# Patient Record
Sex: FEMALE | Race: ASIAN | NOT HISPANIC OR LATINO
[De-identification: names, ages, dates, MRNs, and addresses within clinical notes are randomized per-mention and may not be internally consistent; named-entity substitution may affect disease eponyms.]

---

## 2018-01-01 ENCOUNTER — TRANSCRIPTION ENCOUNTER (OUTPATIENT)
Age: 28
End: 2018-01-01

## 2018-01-01 ENCOUNTER — INPATIENT (INPATIENT)
Facility: HOSPITAL | Age: 28
LOS: 1 days | Discharge: HOME | End: 2018-01-03
Attending: OBSTETRICS & GYNECOLOGY | Admitting: OBSTETRICS & GYNECOLOGY

## 2018-01-01 VITALS
HEART RATE: 81 BPM | TEMPERATURE: 99 F | SYSTOLIC BLOOD PRESSURE: 125 MMHG | DIASTOLIC BLOOD PRESSURE: 83 MMHG | OXYGEN SATURATION: 98 %

## 2018-01-01 DIAGNOSIS — O26.899 OTHER SPECIFIED PREGNANCY RELATED CONDITIONS, UNSPECIFIED TRIMESTER: ICD-10-CM

## 2018-01-01 LAB
BASOPHILS # BLD AUTO: 0.04 K/UL — SIGNIFICANT CHANGE UP (ref 0–0.2)
BASOPHILS NFR BLD AUTO: 0.3 % — SIGNIFICANT CHANGE UP (ref 0–2)
BLD GP AB SCN SERPL QL: NEGATIVE — SIGNIFICANT CHANGE UP
EOSINOPHIL # BLD AUTO: 0.04 K/UL — SIGNIFICANT CHANGE UP (ref 0–0.5)
EOSINOPHIL NFR BLD AUTO: 0.3 % — SIGNIFICANT CHANGE UP (ref 0–6)
HCT VFR BLD CALC: 35.6 % — SIGNIFICANT CHANGE UP (ref 34.5–45)
HGB BLD-MCNC: 11.9 G/DL — SIGNIFICANT CHANGE UP (ref 11.5–15.5)
IMM GRANULOCYTES # BLD AUTO: 0.15 # — SIGNIFICANT CHANGE UP
IMM GRANULOCYTES NFR BLD AUTO: 1.3 % — SIGNIFICANT CHANGE UP (ref 0–1.5)
LYMPHOCYTES # BLD AUTO: 18.6 % — SIGNIFICANT CHANGE UP (ref 13–44)
LYMPHOCYTES # BLD AUTO: 2.21 K/UL — SIGNIFICANT CHANGE UP (ref 1–3.3)
MCHC RBC-ENTMCNC: 26.2 PG — LOW (ref 27–34)
MCHC RBC-ENTMCNC: 33.4 % — SIGNIFICANT CHANGE UP (ref 32–36)
MCV RBC AUTO: 78.4 FL — LOW (ref 80–100)
MONOCYTES # BLD AUTO: 0.46 K/UL — SIGNIFICANT CHANGE UP (ref 0–0.9)
MONOCYTES NFR BLD AUTO: 3.9 % — SIGNIFICANT CHANGE UP (ref 2–14)
NEUTROPHILS # BLD AUTO: 9 K/UL — HIGH (ref 1.8–7.4)
NEUTROPHILS NFR BLD AUTO: 75.6 % — SIGNIFICANT CHANGE UP (ref 43–77)
NRBC # FLD: 0 — SIGNIFICANT CHANGE UP
PLATELET # BLD AUTO: 331 K/UL — SIGNIFICANT CHANGE UP (ref 150–400)
PMV BLD: 9.9 FL — SIGNIFICANT CHANGE UP (ref 7–13)
RBC # BLD: 4.54 M/UL — SIGNIFICANT CHANGE UP (ref 3.8–5.2)
RBC # FLD: 12.3 % — SIGNIFICANT CHANGE UP (ref 10.3–14.5)
RH IG SCN BLD-IMP: POSITIVE — SIGNIFICANT CHANGE UP
T PALLIDUM AB TITR SER: NEGATIVE — SIGNIFICANT CHANGE UP
WBC # BLD: 11.9 K/UL — HIGH (ref 3.8–10.5)
WBC # FLD AUTO: 11.9 K/UL — HIGH (ref 3.8–10.5)

## 2018-01-01 RX ORDER — TETANUS TOXOID, REDUCED DIPHTHERIA TOXOID AND ACELLULAR PERTUSSIS VACCINE, ADSORBED 5; 2.5; 8; 8; 2.5 [IU]/.5ML; [IU]/.5ML; UG/.5ML; UG/.5ML; UG/.5ML
0.5 SUSPENSION INTRAMUSCULAR ONCE
Qty: 0 | Refills: 0 | Status: COMPLETED | OUTPATIENT
Start: 2018-01-01

## 2018-01-01 RX ORDER — DOCUSATE SODIUM 100 MG
100 CAPSULE ORAL
Qty: 0 | Refills: 0 | Status: DISCONTINUED | OUTPATIENT
Start: 2018-01-01 | End: 2018-01-03

## 2018-01-01 RX ORDER — ACETAMINOPHEN 500 MG
975 TABLET ORAL EVERY 6 HOURS
Qty: 0 | Refills: 0 | Status: DISCONTINUED | OUTPATIENT
Start: 2018-01-01 | End: 2018-01-03

## 2018-01-01 RX ORDER — INFLUENZA VIRUS VACCINE 15; 15; 15; 15 UG/.5ML; UG/.5ML; UG/.5ML; UG/.5ML
0.5 SUSPENSION INTRAMUSCULAR ONCE
Qty: 0 | Refills: 0 | Status: DISCONTINUED | OUTPATIENT
Start: 2018-01-01 | End: 2018-01-03

## 2018-01-01 RX ORDER — PRAMOXINE HYDROCHLORIDE 150 MG/15G
1 AEROSOL, FOAM RECTAL EVERY 4 HOURS
Qty: 0 | Refills: 0 | Status: DISCONTINUED | OUTPATIENT
Start: 2018-01-01 | End: 2018-01-01

## 2018-01-01 RX ORDER — KETOROLAC TROMETHAMINE 30 MG/ML
30 SYRINGE (ML) INJECTION ONCE
Qty: 0 | Refills: 0 | Status: DISCONTINUED | OUTPATIENT
Start: 2018-01-01 | End: 2018-01-01

## 2018-01-01 RX ORDER — DIBUCAINE 1 %
1 OINTMENT (GRAM) RECTAL EVERY 4 HOURS
Qty: 0 | Refills: 0 | Status: DISCONTINUED | OUTPATIENT
Start: 2018-01-01 | End: 2018-01-01

## 2018-01-01 RX ORDER — AER TRAVELER 0.5 G/1
1 SOLUTION RECTAL; TOPICAL EVERY 4 HOURS
Qty: 0 | Refills: 0 | Status: DISCONTINUED | OUTPATIENT
Start: 2018-01-01 | End: 2018-01-03

## 2018-01-01 RX ORDER — SIMETHICONE 80 MG/1
80 TABLET, CHEWABLE ORAL EVERY 6 HOURS
Qty: 0 | Refills: 0 | Status: DISCONTINUED | OUTPATIENT
Start: 2018-01-01 | End: 2018-01-03

## 2018-01-01 RX ORDER — OXYTOCIN 10 UNIT/ML
41.67 VIAL (ML) INJECTION
Qty: 20 | Refills: 0 | Status: DISCONTINUED | OUTPATIENT
Start: 2018-01-01 | End: 2018-01-01

## 2018-01-01 RX ORDER — DIBUCAINE 1 %
1 OINTMENT (GRAM) RECTAL EVERY 4 HOURS
Qty: 0 | Refills: 0 | Status: DISCONTINUED | OUTPATIENT
Start: 2018-01-01 | End: 2018-01-03

## 2018-01-01 RX ORDER — ACETAMINOPHEN 500 MG
975 TABLET ORAL EVERY 6 HOURS
Qty: 0 | Refills: 0 | Status: COMPLETED | OUTPATIENT
Start: 2018-01-01 | End: 2018-11-30

## 2018-01-01 RX ORDER — DIPHENHYDRAMINE HCL 50 MG
25 CAPSULE ORAL EVERY 6 HOURS
Qty: 0 | Refills: 0 | Status: DISCONTINUED | OUTPATIENT
Start: 2018-01-01 | End: 2018-01-03

## 2018-01-01 RX ORDER — LANOLIN
1 OINTMENT (GRAM) TOPICAL EVERY 6 HOURS
Qty: 0 | Refills: 0 | Status: DISCONTINUED | OUTPATIENT
Start: 2018-01-01 | End: 2018-01-03

## 2018-01-01 RX ORDER — OXYTOCIN 10 UNIT/ML
333.33 VIAL (ML) INJECTION
Qty: 20 | Refills: 0 | Status: DISCONTINUED | OUTPATIENT
Start: 2018-01-01 | End: 2018-01-01

## 2018-01-01 RX ORDER — HYDROCORTISONE 1 %
1 OINTMENT (GRAM) TOPICAL EVERY 4 HOURS
Qty: 0 | Refills: 0 | Status: DISCONTINUED | OUTPATIENT
Start: 2018-01-01 | End: 2018-01-02

## 2018-01-01 RX ORDER — GLYCERIN ADULT
1 SUPPOSITORY, RECTAL RECTAL AT BEDTIME
Qty: 0 | Refills: 0 | Status: DISCONTINUED | OUTPATIENT
Start: 2018-01-01 | End: 2018-01-03

## 2018-01-01 RX ORDER — IBUPROFEN 200 MG
600 TABLET ORAL EVERY 6 HOURS
Qty: 0 | Refills: 0 | Status: COMPLETED | OUTPATIENT
Start: 2018-01-01 | End: 2018-11-30

## 2018-01-01 RX ORDER — SODIUM CHLORIDE 9 MG/ML
3 INJECTION INTRAMUSCULAR; INTRAVENOUS; SUBCUTANEOUS EVERY 8 HOURS
Qty: 0 | Refills: 0 | Status: DISCONTINUED | OUTPATIENT
Start: 2018-01-01 | End: 2018-01-03

## 2018-01-01 RX ORDER — OXYTOCIN 10 UNIT/ML
41.67 VIAL (ML) INJECTION
Qty: 20 | Refills: 0 | Status: DISCONTINUED | OUTPATIENT
Start: 2018-01-01 | End: 2018-01-02

## 2018-01-01 RX ORDER — PRAMOXINE HYDROCHLORIDE 150 MG/15G
1 AEROSOL, FOAM RECTAL EVERY 4 HOURS
Qty: 0 | Refills: 0 | Status: DISCONTINUED | OUTPATIENT
Start: 2018-01-01 | End: 2018-01-02

## 2018-01-01 RX ORDER — MAGNESIUM HYDROXIDE 400 MG/1
30 TABLET, CHEWABLE ORAL
Qty: 0 | Refills: 0 | Status: DISCONTINUED | OUTPATIENT
Start: 2018-01-01 | End: 2018-01-03

## 2018-01-01 RX ORDER — AER TRAVELER 0.5 G/1
1 SOLUTION RECTAL; TOPICAL EVERY 4 HOURS
Qty: 0 | Refills: 0 | Status: DISCONTINUED | OUTPATIENT
Start: 2018-01-01 | End: 2018-01-01

## 2018-01-01 RX ORDER — SODIUM CHLORIDE 9 MG/ML
1000 INJECTION, SOLUTION INTRAVENOUS
Qty: 0 | Refills: 0 | Status: DISCONTINUED | OUTPATIENT
Start: 2018-01-01 | End: 2018-01-01

## 2018-01-01 RX ORDER — SODIUM CHLORIDE 9 MG/ML
3 INJECTION INTRAMUSCULAR; INTRAVENOUS; SUBCUTANEOUS EVERY 8 HOURS
Qty: 0 | Refills: 0 | Status: DISCONTINUED | OUTPATIENT
Start: 2018-01-01 | End: 2018-01-01

## 2018-01-01 RX ORDER — SODIUM CHLORIDE 9 MG/ML
1000 INJECTION, SOLUTION INTRAVENOUS ONCE
Qty: 0 | Refills: 0 | Status: DISCONTINUED | OUTPATIENT
Start: 2018-01-01 | End: 2018-01-01

## 2018-01-01 RX ORDER — OXYTOCIN 10 UNIT/ML
10 VIAL (ML) INJECTION ONCE
Qty: 0 | Refills: 0 | Status: COMPLETED | OUTPATIENT
Start: 2018-01-01 | End: 2018-01-01

## 2018-01-01 RX ORDER — HYDROCORTISONE 1 %
1 OINTMENT (GRAM) TOPICAL EVERY 4 HOURS
Qty: 0 | Refills: 0 | Status: DISCONTINUED | OUTPATIENT
Start: 2018-01-01 | End: 2018-01-01

## 2018-01-01 RX ORDER — IBUPROFEN 200 MG
600 TABLET ORAL EVERY 6 HOURS
Qty: 0 | Refills: 0 | Status: DISCONTINUED | OUTPATIENT
Start: 2018-01-01 | End: 2018-01-03

## 2018-01-01 RX ORDER — OXYCODONE HYDROCHLORIDE 5 MG/1
5 TABLET ORAL
Qty: 0 | Refills: 0 | Status: DISCONTINUED | OUTPATIENT
Start: 2018-01-01 | End: 2018-01-03

## 2018-01-01 RX ORDER — OXYCODONE HYDROCHLORIDE 5 MG/1
5 TABLET ORAL EVERY 4 HOURS
Qty: 0 | Refills: 0 | Status: DISCONTINUED | OUTPATIENT
Start: 2018-01-01 | End: 2018-01-03

## 2018-01-01 RX ADMIN — Medication 1 TABLET(S): at 13:06

## 2018-01-01 RX ADMIN — Medication 975 MILLIGRAM(S): at 15:30

## 2018-01-01 RX ADMIN — Medication 30 MILLIGRAM(S): at 11:18

## 2018-01-01 RX ADMIN — Medication 600 MILLIGRAM(S): at 20:49

## 2018-01-01 RX ADMIN — Medication 975 MILLIGRAM(S): at 14:49

## 2018-01-01 RX ADMIN — SODIUM CHLORIDE 3 MILLILITER(S): 9 INJECTION INTRAMUSCULAR; INTRAVENOUS; SUBCUTANEOUS at 22:30

## 2018-01-01 RX ADMIN — Medication 600 MILLIGRAM(S): at 21:30

## 2018-01-01 RX ADMIN — Medication 10 UNIT(S): at 09:18

## 2018-01-01 RX ADMIN — Medication 125 MILLIUNIT(S)/MIN: at 11:19

## 2018-01-01 RX ADMIN — SODIUM CHLORIDE 3 MILLILITER(S): 9 INJECTION INTRAMUSCULAR; INTRAVENOUS; SUBCUTANEOUS at 17:25

## 2018-01-01 NOTE — DISCHARGE NOTE OB - MEDICATION SUMMARY - MEDICATIONS TO TAKE
I will START or STAY ON the medications listed below when I get home from the hospital:    acetaminophen 325 mg oral tablet  -- 3 tab(s) by mouth every 6 hours  -- Indication: For Other pregnancy-related conditions, antepartum    ibuprofen 600 mg oral tablet  -- 1 tab(s) by mouth every 6 hours  -- Indication: For Other pregnancy-related conditions, antepartum I will START or STAY ON the medications listed below when I get home from the hospital:    acetaminophen 325 mg oral tablet  -- 3 tab(s) by mouth every 6 hours, As Needed  -- Indication: For moderate pain    ibuprofen 600 mg oral tablet  -- 1 tab(s) by mouth every 6 hours, As Needed  -- Indication: For moderate pain    Prenatal Multivitamins with Folic Acid 1 mg oral tablet  -- 1 tab(s) by mouth once a day  -- Indication: For supplement

## 2018-01-01 NOTE — DISCHARGE NOTE OB - MATERIALS PROVIDED
Vaccinations/Breastfeeding Mother’s Support Group Information/Guide to Postpartum Care/Discharge Medication Information for Patients and Families Pocket Guide/Tdap Vaccination (VIS Pub Date: 2012)/Birth Certificate Instructions/[facility] Hearing Screen Program/[facility]  Screening Program/Back To Sleep Handout/[place]  Immunization Record/Breastfeeding Log/Bottle Feeding Log/Shaken Baby Prevention Handout/Breastfeeding Guide and Packet

## 2018-01-01 NOTE — DISCHARGE NOTE OB - PATIENT PORTAL LINK FT
“You can access the [facility] Patient Portal, offered by Health system, by registering with the following website: [email or web address]”

## 2018-01-01 NOTE — DISCHARGE NOTE OB - CARE PROVIDER_API CALL
Ofe Moore), Obstetrics  Gynecology  [address]  [address]  [place] [address]  Phone: [phone]  Fax: [phone]

## 2018-01-02 RX ORDER — ACETAMINOPHEN 500 MG
3 TABLET ORAL
Qty: 0 | Refills: 0 | COMMUNITY
Start: 2018-01-02

## 2018-01-02 RX ORDER — TETANUS TOXOID, REDUCED DIPHTHERIA TOXOID AND ACELLULAR PERTUSSIS VACCINE, ADSORBED 5; 2.5; 8; 8; 2.5 [IU]/.5ML; [IU]/.5ML; UG/.5ML; UG/.5ML; UG/.5ML
0.5 SUSPENSION INTRAMUSCULAR ONCE
Qty: 0 | Refills: 0 | Status: COMPLETED | OUTPATIENT
Start: 2018-01-02 | End: 2018-01-02

## 2018-01-02 RX ORDER — HYDROCORTISONE 1 %
1 OINTMENT (GRAM) TOPICAL EVERY 4 HOURS
Qty: 0 | Refills: 0 | Status: DISCONTINUED | OUTPATIENT
Start: 2018-01-02 | End: 2018-01-03

## 2018-01-02 RX ORDER — IBUPROFEN 200 MG
1 TABLET ORAL
Qty: 0 | Refills: 0 | COMMUNITY
Start: 2018-01-02

## 2018-01-02 RX ORDER — PRAMOXINE HYDROCHLORIDE 150 MG/15G
1 AEROSOL, FOAM RECTAL EVERY 4 HOURS
Qty: 0 | Refills: 0 | Status: DISCONTINUED | OUTPATIENT
Start: 2018-01-02 | End: 2018-01-03

## 2018-01-02 RX ADMIN — Medication 975 MILLIGRAM(S): at 10:00

## 2018-01-02 RX ADMIN — Medication 975 MILLIGRAM(S): at 08:52

## 2018-01-02 RX ADMIN — Medication 1 TABLET(S): at 08:53

## 2018-01-02 RX ADMIN — Medication 975 MILLIGRAM(S): at 02:30

## 2018-01-02 RX ADMIN — Medication 975 MILLIGRAM(S): at 01:47

## 2018-01-02 RX ADMIN — Medication 600 MILLIGRAM(S): at 13:36

## 2018-01-02 RX ADMIN — Medication 600 MILLIGRAM(S): at 21:00

## 2018-01-02 RX ADMIN — Medication 600 MILLIGRAM(S): at 20:25

## 2018-01-02 RX ADMIN — Medication 100 MILLIGRAM(S): at 08:53

## 2018-01-02 RX ADMIN — Medication 600 MILLIGRAM(S): at 05:51

## 2018-01-02 RX ADMIN — Medication 600 MILLIGRAM(S): at 14:30

## 2018-01-02 RX ADMIN — TETANUS TOXOID, REDUCED DIPHTHERIA TOXOID AND ACELLULAR PERTUSSIS VACCINE, ADSORBED 0.5 MILLILITER(S): 5; 2.5; 8; 8; 2.5 SUSPENSION INTRAMUSCULAR at 18:33

## 2018-01-02 RX ADMIN — Medication 600 MILLIGRAM(S): at 06:27

## 2018-01-02 NOTE — LACTATION INITIAL EVALUATION - LACTATION INTERVENTIONS
initiate skin to skin/assisted with deep latch and positioning  discussed  signs  of  effective  feeding and  swallowing.  discussed  compression at  breast when  nbn  stops  drinking  and  is  still sucking.  ,  demonstrated  strategies  to  wake  nbn  and   keep  awake   during   feeding .  instructed  to offer both  breast at a feeding ,feed on cue and safe  skin to skin. ,  nbn  had  formula  in  nicu  ,  discussed   nipple  confusion  and   to  offer   both  breasts   first  if  nbn  not  breastfeeding  effectively  hand  express  and  pump  and   give  teaspoons  between  feedings alternative  feeding   method.  reviewed  if  nbn  not  effective  stimulate  breast   with  hand  expression  and  pumping    and  give  expressed  milk   first  followed   by  formula  if  needed/initiate hand expression routine

## 2018-01-02 NOTE — PROGRESS NOTE ADULT - SUBJECTIVE AND OBJECTIVE BOX
SUBJECTIVE:    Pain: Controlled    Complaints: None    MILESTONES:     Alert and oriented x 3  [x  ]  Out of bed /ambulating [  x]    Voiding [x  ]  Due to void [  ]    Tolerating a regular diet.    Infant feeding:   Breast [  ]   Bottle [  ]     Both [ x ]                         Feeding related issues or concerns:    Objective   T(C): 36.6 (18 @ 06:31), Max: 36.7 (18 @ 14:24)  HR: 68 (18 @ 06:31) (68 - 83)  BP: 98/59 (18 @ 06:31) (94/51 - 113/75)  RR: 18 (18 @ 06:31) (16 - 18)  SpO2: 97% (18 @ 06:31) (97% - 100%)  Wt(kg): --                        11.9   11.90 )-----------( 331      ( 2018 08:30 )             35.6         Blood Type: A Positive    RPR: Negative          MEDICATIONS  (STANDING):  acetaminophen   Tablet. 975 milliGRAM(s) Oral every 6 hours  diphtheria/tetanus/pertussis (acellular) Vaccine (ADAcel) 0.5 milliLiter(s) IntraMuscular once  ibuprofen  Tablet 600 milliGRAM(s) Oral every 6 hours  influenza   Vaccine 0.5 milliLiter(s) IntraMuscular once  oxyCODONE    IR 5 milliGRAM(s) Oral every 3 hours  prenatal multivitamin 1 Tablet(s) Oral daily  sodium chloride 0.9% lock flush 3 milliLiter(s) IV Push every 8 hours    MEDICATIONS  (PRN):  dibucaine 1% Ointment 1 Application(s) Topical every 4 hours PRN Perineal Discomfort  diphenhydrAMINE   Capsule 25 milliGRAM(s) Oral every 6 hours PRN Itching  docusate sodium 100 milliGRAM(s) Oral two times a day PRN Stool Softening  glycerin Suppository - Adult 1 Suppository(s) Rectal at bedtime PRN Constipation  hydrocortisone 1% Cream 1 Application(s) Topical every 4 hours PRN perineal discomfort  lanolin Ointment 1 Application(s) Topical every 6 hours PRN Sore Nipples  magnesium hydroxide Suspension 30 milliLiter(s) Oral two times a day PRN Constipation  oxyCODONE    IR 5 milliGRAM(s) Oral every 4 hours PRN Severe Pain (7 -10)  pramoxine 1%/zinc 5% Cream 1 Application(s) Topical every 4 hours PRN perineal discomfort  simethicone 80 milliGRAM(s) Chew every 6 hours PRN Gas  witch hazel Pads 1 Application(s) Topical every 4 hours PRN Perineal Discomfort      ASSESSMENT:      27y      G    P        PP Day #  1     Delivery:   [  ]             [   ]    Assisted delivery  :    Vacuum   [ x ]   Forceps)  [  ]    Indication for assisted delivery: Tracing Abn [x  ]     Maternal Exhaustion [  ]     Other [  ]    Past medical history significant for HPI: Hx. GDM      Current Issues:     Breasts: Soft [x  ]    Engorged [  ]  Nipples:  Abdomen: Soft [ x ]  Nontender [  ]  Nondistended [  ]   Distended [  ]    Vagina: Lochia   Mod [x  ]      Scant [  ]  Heavy  [  ]    Perineum:  Intact [  ]   Laceration   [ x ]   Type of laceration [ 2nd degree         ]    Episiotomy [  ]    Type of episiotomy  [              ]    Lower extremities: Edema  [  ] noted bilaterally .  Nontender Dwayne  [  ]  Negative  Ivette' s  sign [  ] Positive pedal pulses [  ]     Other relevant physical exam findings;      PLAN:    Plan: Increase ambulation, analgesia PRN and pain medication  protocol standing oxycodone, ibuprofen and acetaminophen.    Diet: Continue regular diet    Labs:    Continue routine postpartum care.     Discharge Planning [x  ]    For  Discharge Today  [   ]    Consults :  Social Work [  ]     Lactation [x  ]    Other [      ]

## 2018-01-02 NOTE — LACTATION INITIAL EVALUATION - INTERVENTION OUTCOME
verbalizes understanding/demonstrates understanding of teaching/nbn demonstrated  deep latch and  performed  with sucking and swallowing  noted  but  sleepy a t  this  time  . rn  aware.    continue  to  observe.  reviewed  paced  feeding  .   offer  assistance  as  needed.

## 2018-01-03 VITALS
OXYGEN SATURATION: 100 % | TEMPERATURE: 98 F | HEART RATE: 65 BPM | SYSTOLIC BLOOD PRESSURE: 106 MMHG | RESPIRATION RATE: 20 BRPM | DIASTOLIC BLOOD PRESSURE: 64 MMHG

## 2018-01-03 RX ADMIN — Medication 975 MILLIGRAM(S): at 12:47

## 2018-01-03 RX ADMIN — Medication 600 MILLIGRAM(S): at 12:48

## 2018-01-03 RX ADMIN — Medication 600 MILLIGRAM(S): at 05:41

## 2018-01-03 RX ADMIN — Medication 600 MILLIGRAM(S): at 12:07

## 2018-01-03 RX ADMIN — Medication 100 MILLIGRAM(S): at 05:41

## 2018-01-03 RX ADMIN — Medication 975 MILLIGRAM(S): at 12:06

## 2018-01-03 RX ADMIN — Medication 600 MILLIGRAM(S): at 06:15

## 2018-01-03 NOTE — PROGRESS NOTE ADULT - SUBJECTIVE AND OBJECTIVE BOX
SUBJECTIVE:    Pain: Controlled    Complaints: None    MILESTONES:     Alert and oriented x 3  [x  ]  Out of bed /ambulating [  x]    Voiding [x  ]  Due to void [  ]    Tolerating a regular diet.    Infant feeding:   Breast [  ]   Bottle [  ]     Both [x  ]                         Feeding related issues or concerns:    Objective   T(C): 36.6 (18 @ 05:51), Max: 37.2 (18 @ 21:57)  HR: 65 (18 @ 05:51) (65 - 81)  BP: 106/64 (18 @ 05:51) (100/53 - 106/64)  RR: 20 (18 @ 05:51) (18 - 20)  SpO2: 100% (18 @ 05:51) (98% - 100%)  Wt(kg): --                        11.9   11.90 )-----------( 331      ( 2018 08:30 )             35.6         Blood Type: A Positive    RPR: Negative          MEDICATIONS  (STANDING):  acetaminophen   Tablet. 975 milliGRAM(s) Oral every 6 hours  ibuprofen  Tablet 600 milliGRAM(s) Oral every 6 hours  influenza   Vaccine 0.5 milliLiter(s) IntraMuscular once  oxyCODONE    IR 5 milliGRAM(s) Oral every 3 hours  prenatal multivitamin 1 Tablet(s) Oral daily  sodium chloride 0.9% lock flush 3 milliLiter(s) IV Push every 8 hours    MEDICATIONS  (PRN):  dibucaine 1% Ointment 1 Application(s) Topical every 4 hours PRN Perineal Discomfort  diphenhydrAMINE   Capsule 25 milliGRAM(s) Oral every 6 hours PRN Itching  docusate sodium 100 milliGRAM(s) Oral two times a day PRN Stool Softening  glycerin Suppository - Adult 1 Suppository(s) Rectal at bedtime PRN Constipation  hydrocortisone 1% Cream 1 Application(s) Topical every 4 hours PRN perineal discomfort  lanolin Ointment 1 Application(s) Topical every 6 hours PRN Sore Nipples  magnesium hydroxide Suspension 30 milliLiter(s) Oral two times a day PRN Constipation  oxyCODONE    IR 5 milliGRAM(s) Oral every 4 hours PRN Severe Pain (7 -10)  pramoxine 1%/zinc 5% Cream 1 Application(s) Topical every 4 hours PRN perineal discomfort  simethicone 80 milliGRAM(s) Chew every 6 hours PRN Gas  witch hazel Pads 1 Application(s) Topical every 4 hours PRN Perineal Discomfort      ASSESSMENT:      27y      G    P        PP Day # 2      Delivery:   [  ]             [   ]    Assisted delivery  :    Vacuum   [ x ]   Forceps)  [  ]    Indication for assisted delivery: Tracing Abn [ x ]     Maternal Exhaustion [  ]     Other [  ]    Past medical history significant for HPI:      Current Issues:     Breasts: Soft [x  ]    Engorged [  ]    Nipples:    Abdomen: Soft [ x ]  Nontender [  ]  Nondistended [  ]   Distended [  ]    Vagina: Lochia   Mod [x  ]      Scant [  ]  Heavy  [  ]    Perineum:  Intact [  ]   Laceration   [ x ]   Type of laceration [   2nd degree lac ]    Episiotomy [  ]    Type of episiotomy  [              ]    Lower extremities: Edema  [  ] noted bilaterally .  Nontender Dwayne  [  ]  Negative Ivette's sign [  ] Positive pedal pulses [  ]    Other relevant physical exam findings;      PLAN:    Plan: Increase ambulation, analgesia PRN and pain medication  protocol standing oxycodone, ibuprofen and acetaminophen.    Diet: Continue regular diet    Labs:    Continue routine postpartum care.     Discharge Planning [x  ]    For  Discharge Today  [  x ]    Consults :  Social Work [  ]     Lactation [x  ]    Other [      ]

## 2022-02-10 ENCOUNTER — EMERGENCY (EMERGENCY)
Facility: HOSPITAL | Age: 32
LOS: 1 days | Discharge: HOME | End: 2022-02-10
Attending: EMERGENCY MEDICINE | Admitting: EMERGENCY MEDICINE
Payer: MEDICAID

## 2022-02-10 VITALS
RESPIRATION RATE: 16 BRPM | SYSTOLIC BLOOD PRESSURE: 109 MMHG | DIASTOLIC BLOOD PRESSURE: 61 MMHG | OXYGEN SATURATION: 100 % | TEMPERATURE: 98 F | HEART RATE: 80 BPM | HEIGHT: 62 IN

## 2022-02-10 VITALS
TEMPERATURE: 98 F | SYSTOLIC BLOOD PRESSURE: 105 MMHG | DIASTOLIC BLOOD PRESSURE: 59 MMHG | OXYGEN SATURATION: 100 % | HEART RATE: 67 BPM | RESPIRATION RATE: 16 BRPM

## 2022-02-10 LAB
ALBUMIN SERPL ELPH-MCNC: 3.9 G/DL — SIGNIFICANT CHANGE UP (ref 3.3–5)
ALP SERPL-CCNC: 64 U/L — SIGNIFICANT CHANGE UP (ref 40–120)
ALT FLD-CCNC: 21 U/L — SIGNIFICANT CHANGE UP (ref 4–33)
ANION GAP SERPL CALC-SCNC: 14 MMOL/L — SIGNIFICANT CHANGE UP (ref 7–14)
AST SERPL-CCNC: 32 U/L — SIGNIFICANT CHANGE UP (ref 4–32)
B PERT DNA SPEC QL NAA+PROBE: SIGNIFICANT CHANGE UP
B PERT+PARAPERT DNA PNL SPEC NAA+PROBE: SIGNIFICANT CHANGE UP
BASOPHILS # BLD AUTO: 0.01 K/UL — SIGNIFICANT CHANGE UP (ref 0–0.2)
BASOPHILS NFR BLD AUTO: 0.2 % — SIGNIFICANT CHANGE UP (ref 0–2)
BILIRUB SERPL-MCNC: 0.4 MG/DL — SIGNIFICANT CHANGE UP (ref 0.2–1.2)
BORDETELLA PARAPERTUSSIS (RAPRVP): SIGNIFICANT CHANGE UP
BUN SERPL-MCNC: 9 MG/DL — SIGNIFICANT CHANGE UP (ref 7–23)
C PNEUM DNA SPEC QL NAA+PROBE: SIGNIFICANT CHANGE UP
CALCIUM SERPL-MCNC: 9.2 MG/DL — SIGNIFICANT CHANGE UP (ref 8.4–10.5)
CHLORIDE SERPL-SCNC: 101 MMOL/L — SIGNIFICANT CHANGE UP (ref 98–107)
CO2 SERPL-SCNC: 22 MMOL/L — SIGNIFICANT CHANGE UP (ref 22–31)
CREAT SERPL-MCNC: 0.5 MG/DL — SIGNIFICANT CHANGE UP (ref 0.5–1.3)
EOSINOPHIL # BLD AUTO: 0.05 K/UL — SIGNIFICANT CHANGE UP (ref 0–0.5)
EOSINOPHIL NFR BLD AUTO: 0.9 % — SIGNIFICANT CHANGE UP (ref 0–6)
FLUAV SUBTYP SPEC NAA+PROBE: SIGNIFICANT CHANGE UP
FLUBV RNA SPEC QL NAA+PROBE: SIGNIFICANT CHANGE UP
GLUCOSE SERPL-MCNC: 86 MG/DL — SIGNIFICANT CHANGE UP (ref 70–99)
HADV DNA SPEC QL NAA+PROBE: SIGNIFICANT CHANGE UP
HCOV 229E RNA SPEC QL NAA+PROBE: SIGNIFICANT CHANGE UP
HCOV HKU1 RNA SPEC QL NAA+PROBE: SIGNIFICANT CHANGE UP
HCOV NL63 RNA SPEC QL NAA+PROBE: SIGNIFICANT CHANGE UP
HCOV OC43 RNA SPEC QL NAA+PROBE: SIGNIFICANT CHANGE UP
HCT VFR BLD CALC: 34.5 % — SIGNIFICANT CHANGE UP (ref 34.5–45)
HGB BLD-MCNC: 11.3 G/DL — LOW (ref 11.5–15.5)
HMPV RNA SPEC QL NAA+PROBE: SIGNIFICANT CHANGE UP
HPIV1 RNA SPEC QL NAA+PROBE: SIGNIFICANT CHANGE UP
HPIV2 RNA SPEC QL NAA+PROBE: SIGNIFICANT CHANGE UP
HPIV3 RNA SPEC QL NAA+PROBE: SIGNIFICANT CHANGE UP
HPIV4 RNA SPEC QL NAA+PROBE: SIGNIFICANT CHANGE UP
IANC: 3.32 K/UL — SIGNIFICANT CHANGE UP (ref 1.5–8.5)
IMM GRANULOCYTES NFR BLD AUTO: 0.6 % — SIGNIFICANT CHANGE UP (ref 0–1.5)
LIDOCAIN IGE QN: 10 U/L — SIGNIFICANT CHANGE UP (ref 7–60)
LYMPHOCYTES # BLD AUTO: 1.51 K/UL — SIGNIFICANT CHANGE UP (ref 1–3.3)
LYMPHOCYTES # BLD AUTO: 28.3 % — SIGNIFICANT CHANGE UP (ref 13–44)
M PNEUMO DNA SPEC QL NAA+PROBE: SIGNIFICANT CHANGE UP
MAGNESIUM SERPL-MCNC: 1.9 MG/DL — SIGNIFICANT CHANGE UP (ref 1.6–2.6)
MCHC RBC-ENTMCNC: 25.7 PG — LOW (ref 27–34)
MCHC RBC-ENTMCNC: 32.8 GM/DL — SIGNIFICANT CHANGE UP (ref 32–36)
MCV RBC AUTO: 78.4 FL — LOW (ref 80–100)
MONOCYTES # BLD AUTO: 0.42 K/UL — SIGNIFICANT CHANGE UP (ref 0–0.9)
MONOCYTES NFR BLD AUTO: 7.9 % — SIGNIFICANT CHANGE UP (ref 2–14)
NEUTROPHILS # BLD AUTO: 3.32 K/UL — SIGNIFICANT CHANGE UP (ref 1.8–7.4)
NEUTROPHILS NFR BLD AUTO: 62.1 % — SIGNIFICANT CHANGE UP (ref 43–77)
NRBC # BLD: 0 /100 WBCS — SIGNIFICANT CHANGE UP
NRBC # FLD: 0 K/UL — SIGNIFICANT CHANGE UP
PHOSPHATE SERPL-MCNC: 3.3 MG/DL — SIGNIFICANT CHANGE UP (ref 2.5–4.5)
PLATELET # BLD AUTO: 350 K/UL — SIGNIFICANT CHANGE UP (ref 150–400)
POTASSIUM SERPL-MCNC: 4.4 MMOL/L — SIGNIFICANT CHANGE UP (ref 3.5–5.3)
POTASSIUM SERPL-SCNC: 4.4 MMOL/L — SIGNIFICANT CHANGE UP (ref 3.5–5.3)
PROT SERPL-MCNC: 7.5 G/DL — SIGNIFICANT CHANGE UP (ref 6–8.3)
RAPID RVP RESULT: SIGNIFICANT CHANGE UP
RBC # BLD: 4.4 M/UL — SIGNIFICANT CHANGE UP (ref 3.8–5.2)
RBC # FLD: 13.2 % — SIGNIFICANT CHANGE UP (ref 10.3–14.5)
RSV RNA SPEC QL NAA+PROBE: SIGNIFICANT CHANGE UP
RV+EV RNA SPEC QL NAA+PROBE: SIGNIFICANT CHANGE UP
SARS-COV-2 RNA SPEC QL NAA+PROBE: SIGNIFICANT CHANGE UP
SODIUM SERPL-SCNC: 137 MMOL/L — SIGNIFICANT CHANGE UP (ref 135–145)
WBC # BLD: 5.34 K/UL — SIGNIFICANT CHANGE UP (ref 3.8–10.5)
WBC # FLD AUTO: 5.34 K/UL — SIGNIFICANT CHANGE UP (ref 3.8–10.5)

## 2022-02-10 PROCEDURE — 76830 TRANSVAGINAL US NON-OB: CPT | Mod: 26

## 2022-02-10 PROCEDURE — 99285 EMERGENCY DEPT VISIT HI MDM: CPT

## 2022-02-10 RX ORDER — ONDANSETRON 8 MG/1
4 TABLET, FILM COATED ORAL ONCE
Refills: 0 | Status: COMPLETED | OUTPATIENT
Start: 2022-02-10 | End: 2022-02-10

## 2022-02-10 RX ORDER — SODIUM CHLORIDE 9 MG/ML
1000 INJECTION INTRAMUSCULAR; INTRAVENOUS; SUBCUTANEOUS ONCE
Refills: 0 | Status: COMPLETED | OUTPATIENT
Start: 2022-02-10 | End: 2022-02-10

## 2022-02-10 RX ORDER — ACETAMINOPHEN 500 MG
975 TABLET ORAL ONCE
Refills: 0 | Status: COMPLETED | OUTPATIENT
Start: 2022-02-10 | End: 2022-02-10

## 2022-02-10 RX ORDER — SODIUM CHLORIDE 9 MG/ML
1000 INJECTION, SOLUTION INTRAVENOUS
Refills: 0 | Status: DISCONTINUED | OUTPATIENT
Start: 2022-02-10 | End: 2022-02-14

## 2022-02-10 RX ORDER — ONDANSETRON 8 MG/1
1 TABLET, FILM COATED ORAL
Qty: 6 | Refills: 0
Start: 2022-02-10 | End: 2022-02-11

## 2022-02-10 RX ADMIN — SODIUM CHLORIDE 1000 MILLILITER(S): 9 INJECTION INTRAMUSCULAR; INTRAVENOUS; SUBCUTANEOUS at 17:07

## 2022-02-10 RX ADMIN — ONDANSETRON 4 MILLIGRAM(S): 8 TABLET, FILM COATED ORAL at 17:07

## 2022-02-10 RX ADMIN — ONDANSETRON 4 MILLIGRAM(S): 8 TABLET, FILM COATED ORAL at 19:29

## 2022-02-10 RX ADMIN — Medication 975 MILLIGRAM(S): at 20:10

## 2022-02-10 RX ADMIN — SODIUM CHLORIDE 125 MILLILITER(S): 9 INJECTION, SOLUTION INTRAVENOUS at 19:32

## 2022-02-10 RX ADMIN — SODIUM CHLORIDE 1000 MILLILITER(S): 9 INJECTION INTRAMUSCULAR; INTRAVENOUS; SUBCUTANEOUS at 20:11

## 2022-02-10 RX ADMIN — Medication 975 MILLIGRAM(S): at 19:33

## 2022-02-10 NOTE — ED PROVIDER NOTE - NSFOLLOWUPINSTRUCTIONS_ED_ALL_ED_FT
Follow up with your OBGYN  Dissolve Zofran 4mg tablet under the tongue every 8 hours as needed for nausea/vomiting  Drink fluids    Worsening, continued or new concerning symptoms return to the emergency department.

## 2022-02-10 NOTE — ED PROVIDER NOTE - CLINICAL SUMMARY MEDICAL DECISION MAKING FREE TEXT BOX
32 y/o female with a history of , miscarriage, and ectopic pregnancy presents to the ED 9 weeks pregnant and states that she is vomiting and having diarrhea for the past 2 days. Abdomen is soft and non tender upon examination. Will test for viral syndrome vs. hyperemesis. Will hydrate and provide antiemetics.  Will obtain labs and transvaginal ultrasound. 30 y/o female with a history of , miscarriage, and ectopic pregnancy presents to the ED 9 weeks pregnant and states that she is vomiting and having diarrhea for the past 2 days. Abdomen is soft and non tender upon examination. Concern for viral syndrome vs. hyperemesis. Will hydrate and provide antiemetics.  Will obtain labs and transvaginal ultrasound.

## 2022-02-10 NOTE — ED ADULT NURSE NOTE - OBJECTIVE STATEMENT
pt received to intake 9, states she is pregnant and has been vomiting and having diarrhea. denies fevers. c.o. feeling lightheaded. sl placed labs sent. To sono via wheelchair, denies diarrhea today, vomited prior to arrival.

## 2022-02-10 NOTE — ED ADULT NURSE REASSESSMENT NOTE - REASSESS COMMUNICATION
called RW asking for wallet left in area, wallet found in belongings bag in zone 5, pt decribed wallet as brown  wallet which it was, Wallet labeled and taken to security by Dustin Kwon. pt made aware to  wallet in security, pt unable to come back to hospital tonight. called RW asking for wallet left in area, wallet found in belongings bag in zone 5, pt decribed wallet as brown  wallet which it was, Wallet labeled and taken to security by Dustin Kwon. pt made aware to  wallet in security, pt unable to come back to hospital tonight. Security unable to take wallet, given to CARLENE Goldman who called patient and told her to come to ED in the morning and speak to a manager. Wallet in managers office

## 2022-02-10 NOTE — ED PROVIDER NOTE - PATIENT PORTAL LINK FT
You can access the FollowMyHealth Patient Portal offered by [facility] by registering at the following website: [email or web address]. By joining [facility]’s FollowMyHealth portal, you will also be able to view your health information using other applications (apps) compatible with our system.

## 2022-02-10 NOTE — ED ADULT NURSE NOTE - CHIEF COMPLAINT QUOTE
states" I am vomiting and also had diarrhea since Tuesday and I am 9 weeks pregnant ." also received 2 dose of Moderna vaccine on Tuesday.

## 2022-02-10 NOTE — ED PROVIDER NOTE - ATTENDING CONTRIBUTION TO CARE
Dr Bloch, ATTENDING MD-  I performed a face to face bedside interview with patient regarding history of present illness, review of symptoms and past medical history. I completed an independent physical exam.  I have discussed patient's plan of care with PA.   Documentation as above in the note.  Patient well appearing, NAD < HEENT nml heart s1s2, lungs clear, abd soft nontender, extrem no edema, neuro nml, pulses and skin intact.

## 2022-02-10 NOTE — ED PROVIDER NOTE - OBJECTIVE STATEMENT
30 y/o female with a history of , miscarriage, and ectopic pregnancy presents to the ED 9 weeks pregnant and states that she is vomiting and having diarrhea for the past 2 days. Pt states that she had a miscarriage in 2021. Pt states that she received the second dose of the Moderna vaccine and vomited a few hours later. Pt states that she had 5 episodes of diarrhea. Pt is not able to tolerate PO intake. Pt denies fever, chills, cough, dysuria, and vaginal bleeding. 30 y/o female with a history of , miscarriage, and ectopic pregnancy presents to the ED 9 weeks pregnant and states that she is vomiting and having diarrhea for the past 2 days. Pt states that she had a miscarriage in 2021. Pt states that she received the second dose of the Moderna vaccine 2 days ago and started vomiting a few hours later. Pt states that she had 5 episodes of diarrhea yesterday. Pt is not able to tolerate PO intake. Pt denies fever, chills, cough, dysuria, and vaginal bleeding.

## 2022-03-17 NOTE — ED ADULT TRIAGE NOTE - CHIEF COMPLAINT QUOTE
Post-Care Instructions: I reviewed with the patient in detail post-care instructions. Patient is to wear sunprotection, and avoid picking at any of the treated lesions. Pt may apply Vaseline to crusted or scabbing areas. states" I am vomiting and also had diarrhea since Tuesday and I am 9 weeks pregnant ." also received 2 dose of Moderna vaccine on Tuesday. Duration Of Freeze Thaw-Cycle (Seconds): 10 Render In Bullet Format When Appropriate: No Total Number Of Aks Treated: 17 Detail Level: Zone Consent: The patient's consent was obtained including but not limited to risks of crusting, scabbing, blistering, scarring, darker or lighter pigmentary change, recurrence, incomplete removal and infection.

## 2022-04-11 NOTE — DISCHARGE NOTE OB - FUNCTIONAL STATUS DATE
Aleta/joesph- please call pt and schedule appt
Can we please schedule hospital f/u appt for this patient sometime in the next 2-3 weeks? Thanks.
03-Jan-2018